# Patient Record
Sex: FEMALE
[De-identification: names, ages, dates, MRNs, and addresses within clinical notes are randomized per-mention and may not be internally consistent; named-entity substitution may affect disease eponyms.]

---

## 2018-03-18 NOTE — PCM.HP
H&P History of Present Illness





- General


Date of Service: 18


Admit Problem/Dx: 


 Admission Diagnosis/Problem





Admission Diagnosis/Problem      Pneumonia








Source of Information: Patient, Old Records, Provider





- History of Present Illness


Initial Comments - Free Text/Narative: 


Patient is a 57-year-old female half pack per day smoker who presents to the 

urgent care today with a 4 day history of nonproductive cough, 2 days of low-

grade fever up to 99.7, chills, sweats, and diarrhea starting today. She's had 

increasing shortness of breath so came into the urgent care here and was sent 

over to the emergency room for admission. Flu and RSV were negative, chest x-

ray showed a right lower lobe infiltrate. The patient's saturations were 95% at 

rest but when she got up and ambulated she dropped to 78% on room air. 

Recommendation was for admission.





Past medical history:


#1 Chronic back pain for the patient is on chronic opioid therapy with tramadol.


#2 Hypertension


#3 Gastroesophageal reflux


#4 History of tobacco abuse





Social history: The patient smokes a little less than a half pack cigarettes 

per day. No cigarettes for 4 days. No alcohol use. She is single but lives with 

a significant other who is her decision maker. They live in [place]. She works 

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 She has no children.





Family history:


The patient's mother is alive at 95 and has diabetes mellitus type 2 and is a 

breast cancer survivor. The patient's father had heart disease and  at 79 

of a myocardial infarction. The patient has 6 brothers one of whom had trouble 

with some heart valves otherwise they are all healthy. She had 8 sisters, but 2 

of them have passed away. One  of a brain tumor, and one  from sepsis. 

The other 6 are healthy.








- Related Data


Allergies/Adverse Reactions: 


 Allergies











Allergy/AdvReac Type Severity Reaction Status Date / Time


 


No Known Allergies Allergy   Verified 18 12:54











Home Medications: 


 Home Meds





DULoxetine [Cymbalta] 20 mg PO BEDTIME 18 [History]


Diclofenac Sodium [IJD: Diclofenac Sodium] 75 mg PO .TWICE DAILY W MEALS  [History]


Gabapentin [Neurontin] 300 mg PO BID 18 [History]


Metaxalone [Skelaxin] 800 mg PO BID 18 [History]


Metoprolol Succinate [Toprol XL 50mg] 50 mg PO DAILY 18 [History]


Multivitamin [Multivitamins] 1 each PO DAILY 18 [History]


NK [No Known Home Meds]  18 [History]


Omeprazole [Omeprazole] 20 mg DAILY 18 [History]


traMADol [Ultram] 50 mg PO BID 18 [History]











Past Medical History


Cardiovascular History: Reports: Hypertension


Gastrointestinal History: Reports: GERD


Psychiatric History: Reports: Depression





- Past Surgical History


Musculoskeletal Surgical History: Reports: Shoulder Surgery





Social & Family History





- Family History


Family Medical History: Noncontributory





- Tobacco Use


Smoking Status *Q: Current Every Day Smoker


Years of Tobacco use: 20


Packs/Tins Daily: 0.4





- Caffeine Use


Caffeine Use: Reports: None





- Alcohol Use


Days Per Week of Alcohol Use: 0





- Recreational Drug Use


Recreational Drug Use: No





H&P Review of Systems





- Review of Systems:


Review Of Systems: See Below


General: Reports: Fever, Chills, Weakness, Fatigue, Diaphoresis, Decreased 

Appetite


HEENT: Reports: Other (hoarseness, loss of voice 2 days ago)


Pulmonary: Reports: Shortness of Breath, Cough


Cardiovascular: Reports: Dyspnea on Exertion, Edema (occasional with being on 

her feet.)


Gastrointestinal: Reports: Anorexia


Genitourinary: Reports: No Symptoms


Musculoskeletal: Reports: Back Pain (chronic.)


Skin: Reports: No Symptoms


Psychiatric: Reports: No Symptoms


Neurological: Reports: No Symptoms


Hematologic/Lymphatic: Reports: No Symptoms


Immunologic: Reports: No Symptoms





Exam





- Exam


Exam: See Below





- Vital Signs


Vital Signs: 


 Last Vital Signs











Temp  36.6 C   18 15:40


 


Pulse  105 H  18 15:40


 


Resp  14   18 15:40


 


BP  130/89   18 15:40


 


Pulse Ox  90 L  18 15:40











Weight: 106.594 kg





- Exam


Quality Assessment: Supplemental Oxygen


General: Alert, Oriented, Cooperative


HEENT: PERRLA, Mucosa Moist & Pink, Posterior Pharynx Clear


Neck: Supple


Lungs: Clear to Auscultation, Normal Respiratory Effort


Cardiovascular: Regular Rate, Regular Rhythm, Normal S1, Normal S2


GI/Abdominal Exam: Normal Bowel Sounds, Soft, Non-Tender, No Distention


Back Exam: Normal Inspection


Extremities: Normal Inspection, No Pedal Edema


Skin: Warm, Dry, Intact


Neuro Extensive - Mental Status: Alert, Oriented x3, Normal Mood/Affect





- Patient Data


Lab Results Last 24 hrs: 


Negative for influenza, negative RSV.





Result Diagrams: 


 18 13:20





 18 13:20


Imaging Impressions Last 24 hrs: 


Chest x-ray shows a small right lower lobe infiltrate at the cardiac border on 

the right.








*Q Meaningful Use (ADM)





- VTE *Q


VTE Criteria *Q: 








- Stroke *Q


Stroke Criteria *Q: 








- AMI *Q


AMI Criteria *Q: 








- Problem List


(1) RLL pneumonia


SNOMED Code(s): 215368853


   ICD Code: J18.1 - LOBAR PNEUMONIA, UNSPECIFIED ORGANISM   Status: Acute   

Current Visit: Yes   Problem Details: Right lower lobe pneumonia, community-

acquired with hypoxia on ambulation. Patient is a smoker but does not have a 

history of COPD documented. However without hypoxia, and minimal infiltrate and 

no wheezing, I question an underlying diagnosis of COPD. We'll treat with 

azithromycin and Rocephin. DuoNeb's 4 times a day. She did get a dose of Solu-

Medrol in the emergency department but I'm going to hold off on any further 

steroids at this time.  Will rehydrate with one liter of resuscitation fluids. 

   





(2) HTN (hypertension)


SNOMED Code(s): 38853932


   ICD Code: I10 - ESSENTIAL (PRIMARY) HYPERTENSION   Status: Acute   Current 

Visit: Yes   Problem Details: Well-controlled, continue Toprol-XL.   





(3) Hypokalemia


SNOMED Code(s): 86021846


   ICD Code: E87.6 - HYPOKALEMIA   Status: Acute   Current Visit: Yes   Problem 

Details: Replace orally with 3 doses of 20 mEq each.   





(4) Tobacco abuse


SNOMED Code(s): 698279127


   ICD Code: Z72.0 - TOBACCO USE   Status: Acute   Current Visit: Yes   Problem 

Details: Patient offered nicotine patch. Encouraged to quit smoking.   





(5) DVT prophylaxis


SNOMED Code(s): 652075310


   ICD Code: LKB2612 -    Status: Acute   Current Visit: Yes   Problem Details: 

SCDs, Lovenox, early ambulation.   


Problem List Initiated/Reviewed/Updated: Yes


Orders Last 24hrs: 


 Active Orders 24 hr











 Category Date Time Status


 


 Height and Weight [RC] DAILY Care  18 15:15 Active


 


 Intake and Output [RC] QSHIFT Care  18 15:15 Active


 


 Oxygen Therapy [RC] PRN Care  18 15:15 Active


 


 Pulse Oximetry [RC] PRN Care  18 15:15 Active


 


 RT Aerosol Therapy [RC] ASDIRECTED Care  18 15:21 Active


 


 Supplemental O2 [Oxygen Therapy, ED] [RC] ASDIRECTED Care  18 15:56 

Active


 


 Up With Assistance [RC] ASDIRECTED Care  18 15:15 Active


 


 VTE/DVT Education [RC] Per Unit Routine Care  18 15:15 Active


 


 Vital Signs [RC] Q4H Care  18 15:15 Active


 


 2 Gram Sodium Diet [DIET] Diet  18 Breakfast Active


 


 BASIC METABOLIC PANEL,BMP [CHEM] AM Lab  18 05:11 Ordered


 


 CBC WITH AUTO DIFF [HEME] AM Lab  18 05:11 Ordered


 


 Acetaminophen [Tylenol] Med  18 15:15 Active





 650 mg PO Q4H PRN   


 


 Albuterol/Ipratropium [DuoNeb 3.0-0.5 MG/3 ML] Med  18 16:00 Active





 3 ml NEB QIDRT   


 


 Azithromycin [Zithromax] Med  18 09:00 Ordered





 250 mg PO DAILY   


 


 DULoxetine [Cymbalta] Med  18 21:00 Ordered





 20 mg PO BEDTIME   


 


 Diclofenac Sodium [Voltaren] Med  18 16:15 Ordered





 75 mg PO .TWICE DAILY W MEALS   


 


 Docusate Sodium [Colace] Med  18 15:15 Active





 100 mg PO BID PRN   


 


 Enoxaparin [Lovenox] Med  18 09:00 Active





 40 mg SUBCUT Q24H   


 


 Gabapentin [Neurontin] Med  18 21:00 Ordered





 300 mg PO BID   


 


 Ibuprofen [Motrin] Med  18 15:15 Active





 400 mg PO Q6H PRN   


 


 Metaxalone [Skelaxin] Med  18 21:00 Ordered





 800 mg PO BID   


 


 Metoprolol Succinate [Toprol XL] Med  18 09:00 Ordered





 50 mg PO DAILY   


 


 Nicotine [Habitrol] Med  18 15:15 Active





 14 mg TRDERM DAILY   


 


 Omeprazole [Omeprazole] Med  18 09:00 Ordered





 20 mg PO DAILY   


 


 Potassium Chloride [Klor-Con M20] Med  18 21:00 Ordered





 20 meq PO TID   


 


 Sodium Chloride 0.9% @ 100 MLS/HR(1,000ml) Med  18 16:15 Ordered





 Sodium Chloride 0.9% [Normal Saline] 1,000 ml   





 IV ASDIRECTED   


 


 Sodium Chloride 0.9% [Saline Flush] Med  18 15:17 Active





 10 ml FLUSH ASDIRECTED PRN   


 


 cefTRIAXone [Rocephin] 1,000 mg Med  18 16:15 Ordered





 Sodium Chloride 0.9% [Normal Saline] 50 ml   





 IV Q24H   


 


 traMADol [Ultram] Med  18 21:00 Ordered





 50 mg PO BID   


 


 Antiembolic Hose [OM.PC] Per Unit Routine Oth  18 15:16 Ordered


 


 Sequential Compression Device [OM.PC] Per Unit Routine Oth  18 15:16 

Ordered








 Medication Orders





Acetaminophen (Tylenol)  650 mg PO Q4H PRN


   PRN Reason: Pain (Mild 1-3)/fever


Albuterol/Ipratropium (Duoneb 3.0-0.5 Mg/3 Ml)  3 ml NEB QIDRT [facility]


Diclofenac Sodium (Voltaren)  75 mg PO .TWICE DAILY W MEALS [facility]


Docusate Sodium (Colace)  100 mg PO BID PRN


   PRN Reason: Constipation


Duloxetine HCl (Cymbalta)  20 mg PO BEDTIME [facility]


Enoxaparin Sodium (Lovenox)  40 mg SUBCUT Q24H [facility]


Gabapentin (Neurontin)  300 mg PO BID [facility]


Potassium Cl/Dextrose/Lact Ringer's (D5 Lr With 20 Meq Kcl)  1,000 mls @ 100 mls

/hr IV ASDIRECTED [facility]


Ibuprofen (Motrin)  400 mg PO Q6H PRN


   PRN Reason: Pain (mild 1-3)


Metaxalone (Skelaxin)  800 mg PO BID [facility]


Metoprolol Succinate (Toprol Xl)  50 mg PO DAILY [facility]


Nicotine (Habitrol)  14 mg TRDERM DAILY [facility]


Non-Formulary Medication (Omeprazole [Omeprazole])  20 mg PO DAILY [facility]


Potassium Chloride (Klor-Con M20)  20 meq PO TID [facility]


   Stop: 18 14:01


Sodium Chloride (Saline Flush)  10 ml FLUSH ASDIRECTED PRN


   PRN Reason: Keep Vein Open


   Last Admin: 18 15:17  Dose: 10 ml


Tramadol HCl (Ultram)  50 mg PO BID [facility]








Assessment/Plan Comment:: 


Discussed CODE STATUS on admission with the patient. She is a full code. Does 

want CPR done, chest compressions, electric shocks, medications given if she 

were to get so sick she were to die. She would accept intubation for a short 

period of time for reversible cause but would not want to be intubated long-

term.

## 2018-03-18 NOTE — EDM.PDOC
ED HPI GENERAL MEDICAL PROBLEM





- General


Chief Complaint: Respiratory Problem


Stated Complaint: RESPIRATORY ISSUES


Time Seen by Provider: 03/18/18 13:05


Source of Information: Reports: Patient


History Limitations: Reports: No Limitations





- History of Present Illness


INITIAL COMMENTS - FREE TEXT/NARRATIVE: 





c/o cough x 4d, fever x 2d





nonproductive, smokes < 1/2 ppd, no pneumovax, has had flu vax





went to urgent care, Duoneb there did "not help much"





flu swab and RSV done at urgent care, both neg





CxR ordered by urgent care, then pt sent directly here to ED





has had fever





lives with sig other who is not ill





- Related Data


 Allergies











Allergy/AdvReac Type Severity Reaction Status Date / Time


 


No Known Allergies Allergy   Verified 03/18/18 12:54











Home Meds: 


 Home Meds





DULoxetine [Cymbalta] 20 mg PO BEDTIME 03/18/18 [History]


Diclofenac Sodium [IJD: Diclofenac Sodium] 75 mg PO .TWICE DAILY W MEALS 03/18/ 18 [History]


Gabapentin [Neurontin] 300 mg PO BID 03/18/18 [History]


Metaxalone [Skelaxin] 800 mg PO BID 03/18/18 [History]


Metoprolol Succinate [Toprol XL 50mg] 50 mg PO DAILY 03/18/18 [History]


Multivitamin [Multivitamins] 1 each PO DAILY 03/18/18 [History]


NK [No Known Home Meds]  03/18/18 [History]


Omeprazole [Omeprazole] 20 mg DAILY 03/18/18 [History]


traMADol [Ultram] 50 mg PO BID 03/18/18 [History]











Past Medical History


Cardiovascular History: Reports: Hypertension





- Past Surgical History


Musculoskeletal Surgical History: Reports: Shoulder Surgery





Social & Family History





- Tobacco Use


Smoking Status *Q: Current Every Day Smoker


Years of Tobacco use: 20


Packs/Tins Daily: 0.4





- Caffeine Use


Caffeine Use: Reports: None





- Alcohol Use


Days Per Week of Alcohol Use: 0





- Recreational Drug Use


Recreational Drug Use: No





ED ROS GENERAL





- Review of Systems


Review Of Systems: See Below


Constitutional: Reports: Fever, Malaise, Weakness, Decreased Appetite


HEENT: Reports: No Symptoms


Respiratory: Reports: Shortness of Breath, Cough.  Denies: Sputum


Cardiovascular: Reports: No Symptoms


Endocrine: Reports: No Symptoms


GI/Abdominal: Reports: No Symptoms


: Reports: No Symptoms


Musculoskeletal: Reports: No Symptoms


Skin: Reports: No Symptoms


Neurological: Reports: No Symptoms


Psychiatric: Reports: No Symptoms


Hematologic/Lymphatic: Reports: No Symptoms


Immunologic: Reports: No Symptoms





ED EXAM, GENERAL





- Physical Exam


Exam: See Below


Exam Limited By: No Limitations


General Appearance: Alert, WD/WN, Mild Distress, Other (frequent cough, 

nonproductive, no dyspnea, no accessory muscles, nontoxic, appears mildly 

fatigued)


Eye Exam: Bilateral Eye: Normal Inspection


Ears: Normal External Exam


Nose: Normal Inspection, Normal Mucosa, No Blood


Throat/Mouth: Normal Inspection, Normal Voice, No Airway Compromise


Head: Atraumatic, Normocephalic


Neck: Normal Inspection, Supple, Non-Tender, Full Range of Motion


Respiratory/Chest: Other (fair AE, no discrete rales or wheezes)


Cardiovascular: Regular Rate, Rhythm, No Edema, No Gallop, No JVD, No Rub, 

Other (2/6 SHAWN at LSB, quiet precordium)


GI/Abdominal: Normal Bowel Sounds, Soft, Non-Tender


Back Exam: Normal Inspection


Extremities: Normal Inspection, Normal Range of Motion, Non-Tender, No Pedal 

Edema


Neurological: Alert, Oriented, CN II-XII Intact, Normal Cognition, No Motor/

Sensory Deficits


Psychiatric: Normal Affect


Skin Exam: Warm, Dry, Intact, Normal Color, No Rash


Lymphatic: No Adenopathy





Course





- Vital Signs


Last Recorded V/S: 


 Last Vital Signs











Temp  36.7 C   03/18/18 12:45


 


Pulse  95   03/18/18 12:45


 


Resp  21 H  03/18/18 12:45


 


BP  123/93 H  03/18/18 12:45


 


Pulse Ox  95   03/18/18 12:45














- Orders/Labs/Meds


Orders: 


 Active Orders 24 hr











 Category Date Time Status


 


 CULTURE BLOOD [BC] Urgent Lab  03/18/18 13:20 Received


 


 CULTURE BLOOD [BC] Urgent Lab  03/18/18 14:45 Received


 


 Dextrose 5%-Lactated Ringers with KCl 20 mEq @ 100 mL/ Med  03/18/18 15:15 

Ordered





 Hr (1000 mL)   





 Dextrose 5%-Lact Ringers w/KCl [D5 LR with 20 mEq KCl]   





 1,000 ml   





 IV ASDIRECTED   


 


 Blood Culture x2 Reflex Set [OM.PC] Urgent Oth  03/18/18 13:20 Ordered


 


 EKG 12 Lead [EK] Routine Ther  03/18/18 13:22 Ordered








 Medication Orders





Potassium Cl/Dextrose/Lact Ringer's (D5 Lr With 20 Meq Kcl)  1,000 mls @ 100 mls

/hr IV ASDIRECTED [facility]








Labs: 


 Laboratory Tests











  03/18/18 03/18/18 03/18/18 Range/Units





  13:20 13:20 13:20 


 


WBC  9.6    (4.5-12.0)  X10-3/uL


 


RBC  4.65    (3.23-5.20)  x10(6)uL


 


Hgb  13.3    (11.5-15.5)  g/dL


 


Hct  41.1    (30.0-51.3)  %


 


MCV  88.5    (80-96)  fL


 


MCH  28.5    (27.7-33.6)  pg


 


MCHC  32.3    (32.2-35.4)  g/dL


 


RDW  12.6    (11.5-15.5)  %


 


Plt Count  179    (125-369)  X10(3)uL


 


MPV  10.4    (7.4-10.4)  fL


 


Neut % (Auto)  72.8    (46-82)  %


 


Lymph % (Auto)  21.1    (13-37)  %


 


Mono % (Auto)  5.6    (4-12)  %


 


Eos % (Auto)  0 L    (1.0-5.0)  %


 


Baso % (Auto)  0    (0-2)  %


 


Neut # (Auto)  7.1    (1.6-8.3)  #


 


Lymph # (Auto)  2.0    (0.6-5.0)  #


 


Mono # (Auto)  0.5    (0.0-1.3)  #


 


Eos # (Auto)  0.0    (0.0-0.8)  #


 


Baso # (Auto)  0.0    (0.0-0.2)  #


 


POC VBG pH     (7.31-7.41)  


 


POC VBG pCO2     (41-51)  mmHG


 


POC VBG HCO3     (23-28)  mmol/L


 


POC VBG Total CO2     (24-29)  mmol/L


 


POC VBG Base Excess     (-2-3)  mmol/L


 


Sodium   141   (135-145)  mmol/L


 


Potassium   3.0 L   (3.5-5.3)  mmol/L


 


Chloride   102   (100-110)  mmol/L


 


Carbon Dioxide   25   (21-32)  mmol/L


 


BUN   22 H   (7-18)  mg/dL


 


Creatinine   1.2 H   (0.55-1.02)  mg/dL


 


Est Cr Clr Drug Dosing   TNP   


 


Estimated GFR (MDRD)   46 L   (>60)  


 


BUN/Creatinine Ratio   18.3   (9-20)  


 


Glucose   112   ()  mg/dL


 


Lactic Acid    1.0  (0.4-2.2)  mmol/L


 


Calcium   8.8   (8.6-10.2)  mg/dL


 


Total Bilirubin   0.4   (0.1-1.3)  mg/dL


 


AST   28 H   (5-25)  IU/L


 


ALT   22   (12-36)  U/L


 


Alkaline Phosphatase   79   ()  IU/L


 


Troponin I     (<0.017-0.056)  ng/mL


 


C-Reactive Protein     (0.5-0.9)  mg/dL


 


Total Protein   7.3   (6.0-8.0)  g/dL


 


Albumin   2.9 L   (3.5-5.2)  g/dL


 


Globulin   4.4   g/dL


 


Albumin/Globulin Ratio   0.7   














  03/18/18 03/18/18 03/18/18 Range/Units





  13:20 13:20 13:20 


 


WBC     (4.5-12.0)  X10-3/uL


 


RBC     (3.23-5.20)  x10(6)uL


 


Hgb     (11.5-15.5)  g/dL


 


Hct     (30.0-51.3)  %


 


MCV     (80-96)  fL


 


MCH     (27.7-33.6)  pg


 


MCHC     (32.2-35.4)  g/dL


 


RDW     (11.5-15.5)  %


 


Plt Count     (125-369)  X10(3)uL


 


MPV     (7.4-10.4)  fL


 


Neut % (Auto)     (46-82)  %


 


Lymph % (Auto)     (13-37)  %


 


Mono % (Auto)     (4-12)  %


 


Eos % (Auto)     (1.0-5.0)  %


 


Baso % (Auto)     (0-2)  %


 


Neut # (Auto)     (1.6-8.3)  #


 


Lymph # (Auto)     (0.6-5.0)  #


 


Mono # (Auto)     (0.0-1.3)  #


 


Eos # (Auto)     (0.0-0.8)  #


 


Baso # (Auto)     (0.0-0.2)  #


 


POC VBG pH    7.39  (7.31-7.41)  


 


POC VBG pCO2    34.8 L  (41-51)  mmHG


 


POC VBG HCO3    20.9 L  (23-28)  mmol/L


 


POC VBG Total CO2    22 L  (24-29)  mmol/L


 


POC VBG Base Excess    -4 L  (-2-3)  mmol/L


 


Sodium     (135-145)  mmol/L


 


Potassium     (3.5-5.3)  mmol/L


 


Chloride     (100-110)  mmol/L


 


Carbon Dioxide     (21-32)  mmol/L


 


BUN     (7-18)  mg/dL


 


Creatinine     (0.55-1.02)  mg/dL


 


Est Cr Clr Drug Dosing     


 


Estimated GFR (MDRD)     (>60)  


 


BUN/Creatinine Ratio     (9-20)  


 


Glucose     ()  mg/dL


 


Lactic Acid     (0.4-2.2)  mmol/L


 


Calcium     (8.6-10.2)  mg/dL


 


Total Bilirubin     (0.1-1.3)  mg/dL


 


AST     (5-25)  IU/L


 


ALT     (12-36)  U/L


 


Alkaline Phosphatase     ()  IU/L


 


Troponin I   < 0.017 L   (<0.017-0.056)  ng/mL


 


C-Reactive Protein  48.4 H*    (0.5-0.9)  mg/dL


 


Total Protein     (6.0-8.0)  g/dL


 


Albumin     (3.5-5.2)  g/dL


 


Globulin     g/dL


 


Albumin/Globulin Ratio     











Meds: 


Medications











Generic Name Dose Route Start Last Admin





  Trade Name Freq  PRN Reason Stop Dose Admin


 


Potassium Cl/Dextrose/Lact Ringer's  1,000 mls @ 100 mls/hr  03/18/18 15:15  





  D5 Lr With 20 Meq Kcl  IV   





  ASDIRECTED VANDANA   














Discontinued Medications














Generic Name Dose Route Start Last Admin





  Trade Name Freq  PRN Reason Stop Dose Admin


 


Ceftriaxone Sodium  1,000 mg  03/18/18 14:06  





  Rocephin  IVPUSH  03/18/18 14:07  





  ONETIME ONE   


 


Azithromycin 500 mg/ Sodium  250 mls @ 250 mls/hr  03/18/18 14:06  





  Chloride  IV  03/18/18 15:05  





  ONETIME ONE   


 


Methylprednisolone Sodium Succinate  125 mg  03/18/18 14:15  





  Solu-Medrol  IVPUSH  03/18/18 14:16  





  ONETIME ONE   














- Re-Assessments/Exams


Free Text/Narrative Re-Assessment/Exam: 





03/18/18 15:07


CxR with RLL infiltrate, CBC neg, no shift, however CRP 48





PO drops to 78% with ambulation





d/w hospitalist Dr Shannon Dunbar who accepted pt, pt agrees





Departure





- Departure


Time of Disposition: 15:08


Disposition: Admitted As Inpatient 66


Condition: Fair


Clinical Impression: 


 RLL pneumonia, Dehydration, Acute prerenal azotemia, Elevated C-reactive 

protein, Current smoker, Hypoxia








- Discharge Information


Referrals: 


Myesha Mireles, NP [Primary Care Provider] - 


Forms:  ED Department Discharge





- My Orders


Last 24 Hours: 


My Active Orders





03/18/18 13:20


CULTURE BLOOD [BC] Urgent 


Blood Culture x2 Reflex Set [OM.PC] Urgent 





03/18/18 13:22


EKG 12 Lead [EK] Routine 





03/18/18 14:45


CULTURE BLOOD [BC] Urgent 





03/18/18 15:15


Dextrose 5%-Lactated Ringers with KCl 20 mEq @ 100 mL/Hr (1000 mL) Dextrose 5%-

Lact Ringers w/KCl [D5 LR with 20 mEq KCl] 1,000 ml IV ASDIRECTED 














- Assessment/Plan


Last 24 Hours: 


My Active Orders





03/18/18 13:20


CULTURE BLOOD [BC] Urgent 


Blood Culture x2 Reflex Set [OM.PC] Urgent 





03/18/18 13:22


EKG 12 Lead [EK] Routine 





03/18/18 14:45


CULTURE BLOOD [BC] Urgent 





03/18/18 15:15


Dextrose 5%-Lactated Ringers with KCl 20 mEq @ 100 mL/Hr (1000 mL) Dextrose 5%-

Lact Ringers w/KCl [D5 LR with 20 mEq KCl] 1,000 ml IV ASDIRECTED

## 2018-03-19 NOTE — PCM.PN
- General Info


Date of Service: 03/19/18


Subjective Update: 


50% improved over yesterday. No chest pain, shortness of breath better, dry 

cough. Still no voice. No abdominal pain or nausea or vomiting.








- Patient Data


Vitals - Most Recent: 


 Last Vital Signs











Temp  36.7 C   03/19/18 11:00


 


Pulse  74   03/19/18 11:14


 


Resp  18   03/19/18 11:00


 


BP  117/72   03/19/18 11:00


 


Pulse Ox  92 L  03/19/18 11:14











Weight - Most Recent: 101.151 kg


I&O - Last 24 Hours: 


 Intake & Output











 03/18/18 03/19/18 03/19/18





 22:59 06:59 14:59


 


Intake Total  2200 


 


Output Total  1050 


 


Balance  1150 











Lab Results Last 24 Hours: 


 Laboratory Results - last 24 hr











  03/19/18 03/19/18 Range/Units





  06:45 06:45 


 


WBC  10.0   (4.5-12.0)  X10-3/uL


 


RBC  4.25   (3.23-5.20)  x10(6)uL


 


Hgb  12.6   (11.5-15.5)  g/dL


 


Hct  38.1   (30.0-51.3)  %


 


MCV  89.6   (80-96)  fL


 


MCH  29.7   (27.7-33.6)  pg


 


MCHC  33.2   (32.2-35.4)  g/dL


 


RDW  12.6   (11.5-15.5)  %


 


Plt Count  217   (125-369)  X10(3)uL


 


MPV  10.5 H   (7.4-10.4)  fL


 


Add Manual Diff  Yes   


 


Neutrophils % (Manual)  84 H   (46-82)  %


 


Band Neutrophils %  1   (0-6)  %


 


Lymphocytes % (Manual)  11 L   (13-37)  %


 


Monocytes % (Manual)  4   (4-12)  %


 


Sodium   140  (135-145)  mmol/L


 


Potassium   3.7  (3.5-5.3)  mmol/L


 


Chloride   104  (100-110)  mmol/L


 


Carbon Dioxide   26  (21-32)  mmol/L


 


BUN   14  (7-18)  mg/dL


 


Creatinine   0.8  (0.55-1.02)  mg/dL


 


Est Cr Clr Drug Dosing   69.81  mL/min


 


Estimated GFR (MDRD)   > 60  (>60)  


 


BUN/Creatinine Ratio   17.5  (9-20)  


 


Glucose   162 H  ()  mg/dL


 


Calcium   8.7  (8.6-10.2)  mg/dL











Med Orders - Current: 


 Current Medications





Acetaminophen (Tylenol)  650 mg PO Q4H PRN


   PRN Reason: Pain (Mild 1-3)/fever


Albuterol/Ipratropium (Duoneb 3.0-0.5 Mg/3 Ml)  3 ml NEB QIDRT [facility]


   Last Admin: 03/19/18 11:13 Dose:  3 ml


Azithromycin (Zithromax)  250 mg PO DAILY [facility]


   Stop: 03/22/18 09:01


   Last Admin: 03/19/18 09:10 Dose:  250 mg


Ceftriaxone Sodium (Rocephin)  1,000 mg IVPUSH Q24H [facility]


Diclofenac Sodium (Voltaren)  75 mg PO BIDMEALS [facility]


   Last Admin: 03/19/18 09:10 Dose:  75 mg


Docusate Sodium (Colace)  100 mg PO BID PRN


   PRN Reason: Constipation


Duloxetine HCl (Cymbalta)  20 mg PO BEDTIME [facility]


   Last Admin: 03/18/18 21:23 Dose:  20 mg


Enoxaparin Sodium (Lovenox)  40 mg SUBCUT Q24H [facility]


   Last Admin: 03/19/18 09:08 Dose:  40 mg


Gabapentin (Neurontin)  900 mg PO BID [facility]


   Last Admin: 03/19/18 11:24 Dose:  900 mg


Potassium Cl/Dextrose/Lact Ringer's (D5 Lr With 20 Meq Kcl)  1,000 mls @ 100 mls

/hr IV ASDIRECTED [facility]


   Last Admin: 03/18/18 16:33 Dose:  100 mls/hr


Ibuprofen (Motrin)  400 mg PO Q6H PRN


   PRN Reason: Pain (mild 1-3)


Metaxalone (Skelaxin)  800 mg PO BID [facility]


   Last Admin: 03/19/18 09:10 Dose:  800 mg


Metoprolol Succinate (Toprol Xl)  50 mg PO DAILY [facility]


   Last Admin: 03/19/18 09:10 Dose:  50 mg


Nicotine (Habitrol)  14 mg TRDERM DAILY [facility]


   Last Admin: 03/19/18 09:11 Dose:  Not Given


Pantoprazole Sodium (Protonix***)  40 mg PO DAILY@0600 [facility]


Sodium Chloride (Saline Flush)  10 ml FLUSH ASDIRECTED PRN


   PRN Reason: Keep Vein Open


   Last Admin: 03/19/18 03:50 Dose:  10 ml


Tramadol HCl (Ultram)  50 mg PO BID [facility]


   Last Admin: 03/19/18 09:10 Dose:  50 mg





Discontinued Medications





Ceftriaxone Sodium (Rocephin)  1,000 mg IVPUSH ONETIME ONE


   Stop: 03/18/18 14:07


   Last Admin: 03/18/18 15:18 Dose:  1,000 mg


Gabapentin (Neurontin)  300 mg PO BID [facility]


   Last Admin: 03/18/18 21:21 Dose:  300 mg


Azithromycin 500 mg/ Sodium (Chloride)  250 mls @ 250 mls/hr IV ONETIME ONE


   Stop: 03/18/18 15:05


   Last Admin: 03/18/18 16:32 Dose:  250 mls/hr


Potassium Cl/Dextrose/Lact Ringer's (D5 Lr With 20 Meq Kcl)  1,000 mls @ 100 mls

/hr IV ASDIRECTED [facility]


Ceftriaxone Sodium 1,000 mg/ (Sodium Chloride)  50 mls @ 100 mls/hr IV Q24H [facility]


Sodium Chloride (Normal Saline)  1,000 mls @ 100 mls/hr IV ASDIRECTED [facility]


Methylprednisolone Sodium Succinate (Solu-Medrol)  125 mg IVPUSH ONETIME ONE


   Stop: 03/18/18 14:16


   Last Admin: 03/18/18 15:14 Dose:  125 mg


Pantoprazole Sodium (Protonix***)  40 mg PO ACBREAKFAST [facility]


   Last Admin: 03/19/18 06:55 Dose:  40 mg


Potassium Chloride (Klor-Con M20)  20 meq PO TID [facility]


   Stop: 03/19/18 14:01


   Last Admin: 03/19/18 09:10 Dose:  20 meq











- Exam


General: Alert, Oriented, Cooperative


HEENT: Pupils Equal, Pupils Reactive


Neck: Supple


Lungs: Crackles (Good air movement but bilateral crackles and diminished 

throughout. Occasional expiratory wheezing.)


Cardiovascular: Regular Rate, Regular Rhythm, No Murmurs


GI/Abdominal Exam: Normal Bowel Sounds, Soft, Non-Tender


Extremities: No Pedal Edema


Psy/Mental Status: Alert, Normal Affect, Normal Mood





- Problem List & Annotations


(1) RLL pneumonia


SNOMED Code(s): 850824074


   Code(s): J18.1 - LOBAR PNEUMONIA, UNSPECIFIED ORGANISM   Status: Acute   

Current Visit: Yes   Annotation/Comment:: Likely COPD. Patient is wheezing 

today and although she is significantly improved I am going to start her on 

prednisone therapy. Anticipate 2-3 days continued hospitalization and we'll see 

where her oxygen needs goal.  Continue Rocephin/Azithromycin.   





(2) HTN (hypertension)


SNOMED Code(s): 78369754


   Code(s): I10 - ESSENTIAL (PRIMARY) HYPERTENSION   Status: Acute   Current 

Visit: Yes   Annotation/Comment:: Well-controlled, continue Toprol-XL.   





(3) Hypokalemia


SNOMED Code(s): 16967436


   Code(s): E87.6 - HYPOKALEMIA   Status: Acute   Current Visit: Yes   

Annotation/Comment:: Replaced.  Monitor.   





(4) Tobacco abuse


SNOMED Code(s): 191246291


   Code(s): Z72.0 - TOBACCO USE   Status: Acute   Current Visit: Yes   

Annotation/Comment:: Patient offered nicotine patch. Encouraged to quit 

smoking.   





(5) DVT prophylaxis


SNOMED Code(s): 986700733


   Code(s): SUL3232 -    Status: Acute   Current Visit: Yes   Annotation/Comment

:: SCDs, Lovenox, early ambulation.   





- Problem List Review


Problem List Initiated/Reviewed/Updated: Yes





- My Orders


Last 24 Hours: 


My Active Orders





03/18/18 15:15


Height and Weight [RC] 06 


Intake and Output [RC] 06,14,22 


Oxygen Therapy [RC] PRN 


Pulse Oximetry [RC] PRN 


Up With Assistance [RC] ASDIRECTED 


VTE/DVT Education [RC] Per Unit Routine 


Vital Signs [RC] Q4H 


Acetaminophen [Tylenol]   650 mg PO Q4H PRN 


Docusate Sodium [Colace]   100 mg PO BID PRN 


Ibuprofen [Motrin]   400 mg PO Q6H PRN 


Nicotine [Habitrol]   14 mg TRDERM DAILY 





03/18/18 15:16


Antiembolic Hose [OM.PC] Per Unit Routine 


Sequential Compression Device [OM.PC] Per Unit Routine 





03/18/18 15:17


Sodium Chloride 0.9% [Saline Flush]   10 ml FLUSH ASDIRECTED PRN 





03/18/18 15:21


RT Aerosol Therapy [RC] ASDIRECTED 





03/18/18 16:00


Albuterol/Ipratropium [DuoNeb 3.0-0.5 MG/3 ML]   3 ml NEB QIDRT 





03/18/18 16:16


Code Status [Resuscitation Status] Routine 





03/18/18 16:30


Dextrose 5%-Lact Ringers w/KCl [D5 LR with 20 mEq KCl] 1,000 ml IV ASDIRECTED 





03/18/18 18:00


Diclofenac Sodium [Voltaren]   75 mg PO BIDMEALS 





03/18/18 21:00


DULoxetine [Cymbalta]   20 mg PO BEDTIME 


Metaxalone [Skelaxin]   800 mg PO BID 


traMADol [Ultram]   50 mg PO BID 





03/19/18 09:00


Azithromycin [Zithromax]   250 mg PO DAILY 


Enoxaparin [Lovenox]   40 mg SUBCUT Q24H 


Metoprolol Succinate [Toprol XL]   50 mg PO DAILY 





03/19/18 10:30


Gabapentin [Neurontin]   900 mg PO BID 





03/19/18 15:00


cefTRIAXone [Rocephin]   1,000 mg IVPUSH Q24H 





03/20/18 06:00


Pantoprazole [ProTONIX***]   40 mg PO DAILY@0600 














- Plan


Plan:: 


Discussed CODE STATUS on admission with the patient. She is a full code. Does 

want CPR done, chest compressions, electric shocks, medications given if she 

were to get so sick she were to die. She would accept intubation for a short 

period of time for reversible cause but would not want to be intubated long-

term.

## 2018-03-20 NOTE — PCM.PN
- General Info


Date of Service: 03/20/18


Subjective Update: 


Patient is a 57-year-old female currently on hospital day #3 for right lower 

lobe pneumonia. Patient is 75% improved today. Continues to need 1 L 

supplemental oxygen. Cough is improved. Shortness of breath is improved. No 

nausea, vomiting, diarrhea.








- Patient Data


Vitals - Most Recent: 


 Last Vital Signs











Temp  36.8 C   03/20/18 04:00


 


Pulse  82   03/20/18 08:07


 


Resp  18   03/20/18 04:00


 


BP  132/84   03/20/18 08:07


 


Pulse Ox  91 L  03/20/18 07:55











Weight - Most Recent: 102.512 kg


I&O - Last 24 Hours: 


 Intake & Output











 03/19/18 03/20/18 03/20/18





 22:59 06:59 14:59


 


Intake Total  450 


 


Output Total 650 600 


 


Balance -650 -150 











Med Orders - Current: 


 Current Medications





Acetaminophen (Tylenol)  650 mg PO Q4H PRN


   PRN Reason: Pain (Mild 1-3)/fever


Albuterol/Ipratropium (Duoneb 3.0-0.5 Mg/3 Ml)  3 ml NEB QIDRT [facility]


   Last Admin: 03/20/18 07:04 Dose:  3 ml


Azithromycin (Zithromax)  250 mg PO DAILY [facility]


   Stop: 03/22/18 09:01


   Last Admin: 03/20/18 08:07 Dose:  250 mg


Ceftriaxone Sodium (Rocephin)  1,000 mg IVPUSH Q24H [facility]


   Last Admin: 03/19/18 14:44 Dose:  1,000 mg


Diclofenac Sodium (Voltaren)  75 mg PO BIDMEALS [facility]


   Last Admin: 03/20/18 08:07 Dose:  75 mg


Docusate Sodium (Colace)  100 mg PO BID PRN


   PRN Reason: Constipation


Duloxetine HCl (Cymbalta)  20 mg PO BEDTIME [facility]


   Last Admin: 03/19/18 20:48 Dose:  20 mg


Enoxaparin Sodium (Lovenox)  40 mg SUBCUT Q24H [facility]


   Last Admin: 03/20/18 08:06 Dose:  40 mg


Gabapentin (Neurontin)  900 mg PO BID [facility]


   Last Admin: 03/20/18 08:07 Dose:  900 mg


Potassium Cl/Dextrose/Lact Ringer's (D5 Lr With 20 Meq Kcl)  1,000 mls @ 100 mls

/hr IV ASDIRECTED [facility]


   Last Admin: 03/18/18 16:33 Dose:  100 mls/hr


Ibuprofen (Motrin)  400 mg PO Q6H PRN


   PRN Reason: Pain (mild 1-3)


Metaxalone (Skelaxin)  800 mg PO BID [facility]


   Last Admin: 03/20/18 08:06 Dose:  800 mg


Metoprolol Succinate (Toprol Xl)  50 mg PO DAILY [facility]


   Last Admin: 03/20/18 08:07 Dose:  50 mg


Nicotine (Habitrol)  14 mg TRDERM DAILY [facility]


   Last Admin: 03/20/18 09:04 Dose:  Not Given


Pantoprazole Sodium (Protonix***)  40 mg PO DAILY@0600 [facility]


   Last Admin: 03/20/18 06:04 Dose:  40 mg


Prednisone (Prednisone)  40 mg PO WITHBREAKFAST [facility]


   Stop: 03/24/18 14:46


   Last Admin: 03/20/18 08:06 Dose:  40 mg


Sodium Chloride (Saline Flush)  10 ml FLUSH ASDIRECTED PRN


   PRN Reason: Keep Vein Open


   Last Admin: 03/19/18 14:45 Dose:  10 ml


Tramadol HCl (Ultram)  50 mg PO BID [facility]


   Last Admin: 03/20/18 08:07 Dose:  50 mg





Discontinued Medications





Ceftriaxone Sodium (Rocephin)  1,000 mg IVPUSH ONETIME ONE


   Stop: 03/18/18 14:07


   Last Admin: 03/18/18 15:18 Dose:  1,000 mg


Gabapentin (Neurontin)  300 mg PO BID [facility]


   Last Admin: 03/18/18 21:21 Dose:  300 mg


Azithromycin 500 mg/ Sodium (Chloride)  250 mls @ 250 mls/hr IV ONETIME ONE


   Stop: 03/18/18 15:05


   Last Admin: 03/18/18 16:32 Dose:  250 mls/hr


Potassium Cl/Dextrose/Lact Ringer's (D5 Lr With 20 Meq Kcl)  1,000 mls @ 100 mls

/hr IV ASDIRECTED [facility]


Ceftriaxone Sodium 1,000 mg/ (Sodium Chloride)  50 mls @ 100 mls/hr IV Q24H [facility]


Sodium Chloride (Normal Saline)  1,000 mls @ 100 mls/hr IV ASDIRECTED [facility]


Methylprednisolone Sodium Succinate (Solu-Medrol)  125 mg IVPUSH ONETIME ONE


   Stop: 03/18/18 14:16


   Last Admin: 03/18/18 15:14 Dose:  125 mg


Pantoprazole Sodium (Protonix***)  40 mg PO ACBREAKFAST [facility]


   Last Admin: 03/19/18 06:55 Dose:  40 mg


Potassium Chloride (Klor-Con M20)  20 meq PO TID [facility]


   Stop: 03/19/18 14:01


   Last Admin: 03/19/18 14:30 Dose:  20 meq











- Exam


General: Alert, Oriented, Cooperative, No Acute Distress


HEENT: Pupils Equal, Pupils Reactive


Neck: Supple


Lungs: Rhonchi (Rhonchi and groans particularly on the right lower lobe. Much 

better air movement than prior.)


Cardiovascular: Regular Rate, Regular Rhythm, No Murmurs


GI/Abdominal Exam: Normal Bowel Sounds, Soft, Non-Tender, No Distention


Back Exam: Normal Inspection


Extremities: Normal Inspection, No Pedal Edema


Skin: Warm, Dry, Intact


Psy/Mental Status: Alert, Normal Affect, Normal Mood





- Problem List & Annotations


(1) RLL pneumonia


SNOMED Code(s): 204107730


   Code(s): J18.1 - LOBAR PNEUMONIA, UNSPECIFIED ORGANISM   Status: Acute   

Current Visit: Yes   Annotation/Comment:: With underlying COPD. Prednisone, 

Rocephin, azithromycin. DuoNeb's. Anticipate discharge tomorrow.   





(2) HTN (hypertension)


SNOMED Code(s): 52796592


   Code(s): I10 - ESSENTIAL (PRIMARY) HYPERTENSION   Status: Acute   Current 

Visit: Yes   Annotation/Comment:: Well-controlled, continue Toprol-XL.   





(3) Hypokalemia


SNOMED Code(s): 69704203


   Code(s): E87.6 - HYPOKALEMIA   Status: Acute   Current Visit: Yes   

Annotation/Comment:: Replaced.  Monitor.   





(4) Tobacco abuse


SNOMED Code(s): 658605325


   Code(s): Z72.0 - TOBACCO USE   Status: Acute   Current Visit: Yes   

Annotation/Comment:: Patient offered nicotine patch. Encouraged to quit 

smoking.   





(5) DVT prophylaxis


SNOMED Code(s): 327067233


   Code(s): WNQ5334 -    Status: Acute   Current Visit: Yes   Annotation/Comment

:: SCDs, Lovenox, early ambulation.   





- Problem List Review


Problem List Initiated/Reviewed/Updated: Yes





- My Orders


Last 24 Hours: 


My Active Orders





03/19/18 09:00


Azithromycin [Zithromax]   250 mg PO DAILY 


Enoxaparin [Lovenox]   40 mg SUBCUT Q24H 


Metoprolol Succinate [Toprol XL]   50 mg PO DAILY 





03/19/18 10:30


Gabapentin [Neurontin]   900 mg PO BID 





03/19/18 14:45


predniSONE   40 mg PO WITHBREAKFAST 





03/19/18 15:00


cefTRIAXone [Rocephin]   1,000 mg IVPUSH Q24H 





03/20/18 06:00


Pantoprazole [ProTONIX***]   40 mg PO DAILY@0600 





03/20/18 07:07


IS (RT) [RT Incentive Spirometry] [RC] ASDIRECTED 














- Plan


Plan:: 


Discussed CODE STATUS on admission with the patient. She is a full code. Does 

want CPR done, chest compressions, electric shocks, medications given if she 

were to get so sick she were to die. She would accept intubation for a short 

period of time for reversible cause but would not want to be intubated long-

term.

## 2018-03-21 NOTE — PCM.PN
- General Info


Date of Service: 03/21/18


Subjective Update: 


Patient still feels about 75% of baseline today. Still requiring 1 L of nasal 

cannula oxygen to maintain sats greater than 90%. Still coughing a dry 

nonproductive cough. Still very minimal voice, very hoarse. No chest pain, no 

nausea, no vomiting, no diarrhea.








- Patient Data


Vitals - Most Recent: 


 Last Vital Signs











Temp  36.5 C   03/21/18 07:28


 


Pulse  69   03/21/18 08:50


 


Resp  16   03/21/18 07:28


 


BP  136/83   03/21/18 08:50


 


Pulse Ox  93 L  03/21/18 10:05











Weight - Most Recent: 102.875 kg


I&O - Last 24 Hours: 


 Intake & Output











 03/20/18 03/21/18 03/21/18





 22:59 06:59 14:59


 


Intake Total 900 350 680


 


Output Total 650 300 900


 


Balance 250 50 -220











Med Orders - Current: 


 Current Medications





Acetaminophen (Tylenol)  650 mg PO Q4H PRN


   PRN Reason: Pain (Mild 1-3)/fever


Albuterol/Ipratropium (Duoneb 3.0-0.5 Mg/3 Ml)  3 ml NEB QIDRT [facility]


   Last Admin: 03/21/18 10:51 Dose:  3 ml


Azithromycin (Zithromax)  250 mg PO DAILY [facility]


   Stop: 03/22/18 09:01


   Last Admin: 03/21/18 08:51 Dose:  250 mg


Ceftriaxone Sodium (Rocephin)  1,000 mg IVPUSH Q24H [facility]


   Last Admin: 03/20/18 15:07 Dose:  1,000 mg


Diclofenac Sodium (Voltaren)  75 mg PO BIDMEALS [facility]


   Last Admin: 03/21/18 08:46 Dose:  75 mg


Docusate Sodium (Colace)  100 mg PO BID PRN


   PRN Reason: Constipation


Duloxetine HCl (Cymbalta)  20 mg PO BEDTIME [facility]


   Last Admin: 03/20/18 20:38 Dose:  20 mg


Enoxaparin Sodium (Lovenox)  40 mg SUBCUT Q24H [facility]


   Last Admin: 03/21/18 08:48 Dose:  40 mg


Gabapentin (Neurontin)  900 mg PO BID [facility]


   Last Admin: 03/21/18 08:48 Dose:  900 mg


Potassium Cl/Dextrose/Lact Ringer's (D5 Lr With 20 Meq Kcl)  1,000 mls @ 100 mls

/hr IV ASDIRECTED [facility]


   Last Admin: 03/18/18 16:33 Dose:  100 mls/hr


Ibuprofen (Motrin)  400 mg PO Q6H PRN


   PRN Reason: Pain (mild 1-3)


Metaxalone (Skelaxin)  800 mg PO BID [facility]


   Last Admin: 03/21/18 08:49 Dose:  800 mg


Metoprolol Succinate (Toprol Xl)  50 mg PO DAILY [facility]


   Last Admin: 03/21/18 08:50 Dose:  50 mg


Nicotine (Habitrol)  14 mg TRDERM DAILY [facility]


   Last Admin: 03/21/18 08:47 Dose:  Not Given


Pantoprazole Sodium (Protonix***)  40 mg PO DAILY@0600 [facility]


   Last Admin: 03/21/18 06:57 Dose:  40 mg


Prednisone (Prednisone)  40 mg PO WITHBREAKFAST [facility]


   Stop: 03/24/18 14:46


   Last Admin: 03/21/18 08:45 Dose:  40 mg


Fluticasone/Salmeterol (Advair Hfa 115-21)  2 puff INH BID [facility]


Sodium Chloride (Saline Flush)  10 ml FLUSH ASDIRECTED PRN


   PRN Reason: Keep Vein Open


   Last Admin: 03/19/18 14:45 Dose:  10 ml


Tramadol HCl (Ultram)  50 mg PO Q4H PRN


   PRN Reason: Pain





Discontinued Medications





Ceftriaxone Sodium (Rocephin)  1,000 mg IVPUSH ONETIME ONE


   Stop: 03/18/18 14:07


   Last Admin: 03/18/18 15:18 Dose:  1,000 mg


Gabapentin (Neurontin)  300 mg PO BID [facility]


   Last Admin: 03/18/18 21:21 Dose:  300 mg


Azithromycin 500 mg/ Sodium (Chloride)  250 mls @ 250 mls/hr IV ONETIME ONE


   Stop: 03/18/18 15:05


   Last Admin: 03/18/18 16:32 Dose:  250 mls/hr


Potassium Cl/Dextrose/Lact Ringer's (D5 Lr With 20 Meq Kcl)  1,000 mls @ 100 mls

/hr IV ASDIRECTED [facility]


Ceftriaxone Sodium 1,000 mg/ (Sodium Chloride)  50 mls @ 100 mls/hr IV Q24H [facility]


Sodium Chloride (Normal Saline)  1,000 mls @ 100 mls/hr IV ASDIRECTED [facility]


Methylprednisolone Sodium Succinate (Solu-Medrol)  125 mg IVPUSH ONETIME ONE


   Stop: 03/18/18 14:16


   Last Admin: 03/18/18 15:14 Dose:  125 mg


Pantoprazole Sodium (Protonix***)  40 mg PO ACBREAKFAST [facility]


   Last Admin: 03/19/18 06:55 Dose:  40 mg


Potassium Chloride (Klor-Con M20)  20 meq PO TID [facility]


   Stop: 03/19/18 14:01


   Last Admin: 03/19/18 14:30 Dose:  20 meq


Tramadol HCl (Ultram)  50 mg PO BID [facility]


   Last Admin: 03/21/18 08:58 Dose:  50 mg











- Exam


General: Alert, Oriented, Cooperative, No Acute Distress


HEENT: Pupils Equal, Pupils Reactive


Neck: Supple


Lungs: Decreased Breath Sounds, Wheezing (No crackles or rhonchi but patient 

has popping throughout posterior lung fields.)


Cardiovascular: Regular Rate, Regular Rhythm, No Murmurs


GI/Abdominal Exam: Normal Bowel Sounds, Soft, Non-Tender, No Distention


Back Exam: Normal Inspection


Extremities: No Pedal Edema


Psy/Mental Status: Alert, Normal Affect, Normal Mood





- Problem List & Annotations


(1) RLL pneumonia


SNOMED Code(s): 140957515


   Code(s): J18.1 - LOBAR PNEUMONIA, UNSPECIFIED ORGANISM   Status: Acute   

Current Visit: Yes   Annotation/Comment:: With underlying COPD. Prednisone, 

Rocephin, azithromycin. DuoNeb's. Anticipate discharge tomorrow. Will need to 

discharge home with oxygen.   





(2) HTN (hypertension)


SNOMED Code(s): 72262321


   Code(s): I10 - ESSENTIAL (PRIMARY) HYPERTENSION   Status: Acute   Current 

Visit: Yes   Annotation/Comment:: Well-controlled, continue Toprol-XL.   





(3) Hypokalemia


SNOMED Code(s): 92541343


   Code(s): E87.6 - HYPOKALEMIA   Status: Acute   Current Visit: Yes   

Annotation/Comment:: Replaced.  Monitor.   





(4) Tobacco abuse


SNOMED Code(s): 904018049


   Code(s): Z72.0 - TOBACCO USE   Status: Acute   Current Visit: Yes   

Annotation/Comment:: Patient offered nicotine patch. Encouraged to quit 

smoking.   





(5) DVT prophylaxis


SNOMED Code(s): 668922417


   Code(s): OMW4858 -    Status: Acute   Current Visit: Yes   Annotation/Comment

:: SCDs, Lovenox, early ambulation.   





- Problem List Review


Problem List Initiated/Reviewed/Updated: Yes





- My Orders


Last 24 Hours: 


My Active Orders





03/21/18 09:26


traMADol [Ultram]   50 mg PO Q4H PRN 





03/21/18 21:00


Fluticasone/Salmeterol [Advair -21]   2 puff INH BID 





03/22/18 05:11


BASIC METABOLIC PANEL,BMP [CHEM] AM 


CBC WITH AUTO DIFF [HEME] AM 














- Plan


Plan:: 


Discussed CODE STATUS on admission with the patient. She is a full code. Does 

want CPR done, chest compressions, electric shocks, medications given if she 

were to get so sick she were to die. She would accept intubation for a short 

period of time for reversible cause but would not want to be intubated long-

term.

## 2018-03-22 NOTE — [id number]
**Discharge Summary





- Hospital Course


Free Text/Narrative:: 


Date of admission: 3/18/18


Date of discharge: 3/22/2018





Admission diagnosis: Right lower lobe pneumonia


Discharge diagnosis: Same





Secondary diagnoses:


#1 hypertension


#2 tobacco abuse


#3 hypokalemia





History of present illness:


The patient is a 57-year-old female smoker who presented with 4 days of cough 

nonproductive, fever, chills, shortness of breath, and hypoxia. I was asked to 

admit her for further treatment of her right lower lobe pneumonia.





Hospital course by problem:


#1 right lower lobe pneumonia. Patient was treated with Rocephin 1 g IV every 

24 hours, azithromycin, prednisone 40 mg daily 5 days. She completed these 

before discharge. Condition gradually improved and at the time of discharge she 

was feeling much improved, shortness of breath was much improved, felt like she 

was about 90% of baseline. However she was still requiring supplemental oxygen 

which was suggestive of underlying COPD. Recommended that in the outpatient 

setting she has further evaluation for this. Will be discharged home on a 

steroid inhaler (Dulera) and when necessary albuterol. Follow-up in one week. 

Blood cultures were negative 2.


#2 hypertension. Blood pressures were stable on continued home medication.


#3 tobacco abuse. Encouraged cessation and tobacco replacement products offered.


#4 hypokalemia. Potassium was 2.0 on admission and was replaced orally and was 

3.5 at discharge.














- Discharge Data


Discharge Date: 03/22/18


Discharge Disposition: Home, Self-Care 01


Condition: Fair





- Discharge Diagnosis/Problem(s)


(1) RLL pneumonia


SNOMED Code(s): 994697033


   ICD Code: J18.1 - LOBAR PNEUMONIA, UNSPECIFIED ORGANISM   Status: Acute   

Current Visit: Yes   Problem Details: With underlying COPD. Completed 5 days 

Prednisone, Rocephin, azithromycin. DuoNeb's. Ready for discharge. Will be 

discharged on home oxygen. Recommended no work for a week until seen in follow-

up.   





(2) HTN (hypertension)


SNOMED Code(s): 81674778


   ICD Code: I10 - ESSENTIAL (PRIMARY) HYPERTENSION   Status: Acute   Current 

Visit: Yes   Problem Details: Well-controlled, continue Toprol-XL.   





(3) Hypokalemia


SNOMED Code(s): 99203638


   ICD Code: E87.6 - HYPOKALEMIA   Status: Acute   Current Visit: Yes   Problem 

Details: Replaced.  Monitor.   





(4) Tobacco abuse


SNOMED Code(s): 724175670


   ICD Code: Z72.0 - TOBACCO USE   Status: Acute   Current Visit: Yes   Problem 

Details: Patient offered nicotine patch. Encouraged to quit smoking.   





(5) DVT prophylaxis


SNOMED Code(s): 568584633


   ICD Code: KFY5765 -    Status: Acute   Current Visit: Yes   Problem Details: 

SCDs, Lovenox, early ambulation.   





- Discharge Plan


Prescriptions/Med Rec: 


Albuterol [Proventil HFA] 2 puff INH Q4H PRN #1 inhaler


 PRN Reason: sob


Mometasone/Formoterol [Dulera 200-5 MCG] 2 puff INH BID #1 hfa.aer.ad


Home Medications: 


 Home Meds





DULoxetine [Cymbalta] 20 mg PO BEDTIME 03/18/18 [History]


Diclofenac Sodium [IJD: Diclofenac Sodium] 75 mg PO BIDMEALS 03/18/18 [History]


Gabapentin [Neurontin] 900 mg PO BID 03/18/18 [History]


Metaxalone [Skelaxin] 800 mg PO BID 03/18/18 [History]


Metoprolol Succinate [Toprol XL 50mg] 50 mg PO DAILY 03/18/18 [History]


Multivitamin [Multivitamins] 1 each PO DAILY 03/18/18 [History]


Omeprazole 20 mg PO DAILY 03/18/18 [History]


traMADol [Ultram] 50 mg PO BID 03/18/18 [History]


Albuterol [Proventil HFA] 2 puff INH Q4H PRN #1 inhaler 03/22/18 [Rx]


Mometasone/Formoterol [Dulera 200-5 MCG] 2 puff INH BID #1 hfa.aer.ad 03/22/18 [

Rx]








Patient Handouts:  Venous Thromboembolism, Preventing Antibiotic Resistance, 

Deep Vein Thrombosis, Community-Acquired Pneumonia, Adult


Forms:  ED Department Discharge


Referrals: 


Myesha Mireles NP [Primary Care Provider] - 





- General Info


Date of Service: 03/22/18


Subjective Update: 





On the day of discharge, patient felt her breathing was about 90% back to 

baseline. No chest pain, no nausea, no vomiting, no diarrhea. Wanted to go home 

and felt ready to go home. Supplemental oxygen will be provided at home.





- Patient Data


Vitals - Most Recent: 


 Last Vital Signs











Temp  36.6 C   03/22/18 00:00


 


Pulse  78   03/22/18 08:32


 


Resp  18   03/22/18 00:00


 


BP  129/83   03/22/18 08:32


 


Pulse Ox  92 L  03/22/18 00:00











Weight - Most Recent: 103.056 kg


I&O - Last 24 hours: 


 Intake & Output











 03/21/18 03/22/18 03/22/18





 22:59 06:59 14:59


 


Intake Total  200 


 


Output Total 200 450 


 


Balance -200 -250 











Lab Results - Last 24 hrs: 


 Laboratory Results - last 24 hr











  03/22/18 03/22/18 Range/Units





  05:55 05:55 


 


WBC  9.3   (4.5-12.0)  X10-3/uL


 


RBC  4.15   (3.23-5.20)  x10(6)uL


 


Hgb  12.1   (11.5-15.5)  g/dL


 


Hct  36.7   (30.0-51.3)  %


 


MCV  88.4   (80-96)  fL


 


MCH  29.2   (27.7-33.6)  pg


 


MCHC  33.0   (32.2-35.4)  g/dL


 


RDW  13.0   (11.5-15.5)  %


 


Plt Count  301   (125-369)  X10(3)uL


 


MPV  8.9   (7.4-10.4)  fL


 


Neut % (Auto)  60.7   (46-82)  %


 


Lymph % (Auto)  27.0   (13-37)  %


 


Mono % (Auto)  10.4   (4-12)  %


 


Eos % (Auto)  0 L   (1.0-5.0)  %


 


Baso % (Auto)  2   (0-2)  %


 


Neut # (Auto)  5.6   (1.6-8.3)  #


 


Lymph # (Auto)  2.5   (0.6-5.0)  #


 


Mono # (Auto)  1.0   (0.0-1.3)  #


 


Eos # (Auto)  0.0   (0.0-0.8)  #


 


Baso # (Auto)  0.2   (0.0-0.2)  #


 


Sodium   142  (135-145)  mmol/L


 


Potassium   3.5  (3.5-5.3)  mmol/L


 


Chloride   106  (100-110)  mmol/L


 


Carbon Dioxide   28  (21-32)  mmol/L


 


BUN   20 H  (7-18)  mg/dL


 


Creatinine   0.7  (0.55-1.02)  mg/dL


 


Est Cr Clr Drug Dosing   79.79  mL/min


 


Estimated GFR (MDRD)   > 60  (>60)  


 


BUN/Creatinine Ratio   28.6 H  (9-20)  


 


Glucose   89  ()  mg/dL


 


Calcium   8.5 L  (8.6-10.2)  mg/dL











Med Orders - Current: 


 Current Medications





Acetaminophen (Tylenol)  650 mg PO Q4H PRN


   PRN Reason: Pain (Mild 1-3)/fever


Albuterol/Ipratropium (Duoneb 3.0-0.5 Mg/3 Ml)  3 ml NEB QIDRT [facility]


   Last Admin: 03/22/18 07:26 Dose:  3 ml


Ceftriaxone Sodium (Rocephin)  1,000 mg IVPUSH Q24H [facility]


   Last Admin: 03/21/18 15:50 Dose:  1,000 mg


Diclofenac Sodium (Voltaren)  75 mg PO BIDMEALS [facility]


   Last Admin: 03/22/18 08:33 Dose:  75 mg


Docusate Sodium (Colace)  100 mg PO BID PRN


   PRN Reason: Constipation


Duloxetine HCl (Cymbalta)  20 mg PO BEDTIME [facility]


   Last Admin: 03/21/18 21:01 Dose:  20 mg


Enoxaparin Sodium (Lovenox)  40 mg SUBCUT Q24H [facility]


   Last Admin: 03/22/18 08:34 Dose:  40 mg


Gabapentin (Neurontin)  900 mg PO BID [facility]


   Last Admin: 03/22/18 08:34 Dose:  900 mg


Potassium Cl/Dextrose/Lact Ringer's (D5 Lr With 20 Meq Kcl)  1,000 mls @ 100 mls

/hr IV ASDIRECTED [facility]


   Last Admin: 03/18/18 16:33 Dose:  100 mls/hr


Ibuprofen (Motrin)  400 mg PO Q6H PRN


   PRN Reason: Pain (mild 1-3)


Metaxalone (Skelaxin)  800 mg PO BID [facility]


   Last Admin: 03/22/18 08:34 Dose:  800 mg


Metoprolol Succinate (Toprol Xl)  50 mg PO DAILY [facility]


   Last Admin: 03/22/18 08:32 Dose:  50 mg


Mometasone Furoate/Formoterol Fumar (Dulera 200-5 Mcg)  2 puff IH BID [facility]


   Last Admin: 03/22/18 08:33 Dose:  2 puff


Nicotine (Habitrol)  14 mg TRDERM DAILY [facility]


   Last Admin: 03/22/18 08:34 Dose:  Not Given


Pantoprazole Sodium (Protonix***)  40 mg PO DAILY@0600 [facility]


   Last Admin: 03/22/18 05:46 Dose:  40 mg


Prednisone (Prednisone)  40 mg PO WITHBREAKFAST [facility]


   Stop: 03/24/18 14:46


   Last Admin: 03/22/18 08:32 Dose:  40 mg


Sodium Chloride (Saline Flush)  10 ml FLUSH ASDIRECTED PRN


   PRN Reason: Keep Vein Open


   Last Admin: 03/21/18 15:50 Dose:  10 ml


Tramadol HCl (Ultram)  50 mg PO Q4H PRN


   PRN Reason: Pain





Discontinued Medications





Azithromycin (Zithromax)  250 mg PO DAILY [facility]


   Stop: 03/22/18 09:01


   Last Admin: 03/22/18 08:33 Dose:  250 mg


Ceftriaxone Sodium (Rocephin)  1,000 mg IVPUSH ONETIME ONE


   Stop: 03/18/18 14:07


   Last Admin: 03/18/18 15:18 Dose:  1,000 mg


Gabapentin (Neurontin)  300 mg PO BID [facility]


   Last Admin: 03/18/18 21:21 Dose:  300 mg


Azithromycin 500 mg/ Sodium (Chloride)  250 mls @ 250 mls/hr IV ONETIME ONE


   Stop: 03/18/18 15:05


   Last Admin: 03/18/18 16:32 Dose:  250 mls/hr


Potassium Cl/Dextrose/Lact Ringer's (D5 Lr With 20 Meq Kcl)  1,000 mls @ 100 mls

/hr IV ASDIRECTED [facility]


Ceftriaxone Sodium 1,000 mg/ (Sodium Chloride)  50 mls @ 100 mls/hr IV Q24H [facility]


Sodium Chloride (Normal Saline)  1,000 mls @ 100 mls/hr IV ASDIRECTED [facility]


Methylprednisolone Sodium Succinate (Solu-Medrol)  125 mg IVPUSH ONETIME ONE


   Stop: 03/18/18 14:16


   Last Admin: 03/18/18 15:14 Dose:  125 mg


Pantoprazole Sodium (Protonix***)  40 mg PO ACBREAKFAST [facility]


   Last Admin: 03/19/18 06:55 Dose:  40 mg


Potassium Chloride (Klor-Con M20)  20 meq PO TID [facility]


   Stop: 03/19/18 14:01


   Last Admin: 03/19/18 14:30 Dose:  20 meq


Tramadol HCl (Ultram)  50 mg PO BID [facility]


   Last Admin: 03/21/18 08:58 Dose:  50 mg











- Exam


Quality Assessment: Reports: Supplemental Oxygen


General: Reports: Alert, Oriented, Cooperative, No Acute Distress


HEENT: Reports: Pupils Equal, Pupils Reactive


Neck: Reports: Supple


Lungs: Reports: Clear to Auscultation (No wheezing, no rhonchi, still has some 

popping with initial inspiration.)


Cardiovascular: Reports: Regular Rate, Regular Rhythm


GI/Abdominal Exam: Normal Bowel Sounds, Soft, Non-Tender, No Distention


Extremities: No Pedal Edema


Psy/Mental Status: Reports: Alert, Normal Affect, Normal Mood





*Q Meaningful Use (DIS)





- VTE *Q


VTE Criteria *Q: 








- Stroke *Q


Stroke Criteria *Q: 








- AMI *Q


AMI Criteria *Q:

## 2022-10-22 ENCOUNTER — HOSPITAL ENCOUNTER (EMERGENCY)
Dept: HOSPITAL 7 - FB.ED | Age: 62
Discharge: HOME | End: 2022-10-22
Payer: COMMERCIAL

## 2022-10-22 VITALS — SYSTOLIC BLOOD PRESSURE: 179 MMHG | DIASTOLIC BLOOD PRESSURE: 97 MMHG

## 2022-10-22 VITALS — HEART RATE: 68 BPM
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
## 2022-10-22 DIAGNOSIS — I10: ICD-10-CM

## 2022-10-22 DIAGNOSIS — Z79.899: ICD-10-CM

## 2022-10-22 DIAGNOSIS — S82.002A: Primary | ICD-10-CM

## 2022-10-22 PROCEDURE — 73562 X-RAY EXAM OF KNEE 3: CPT

## 2022-10-22 PROCEDURE — 99283 EMERGENCY DEPT VISIT LOW MDM: CPT
